# Patient Record
Sex: MALE | NOT HISPANIC OR LATINO
[De-identification: names, ages, dates, MRNs, and addresses within clinical notes are randomized per-mention and may not be internally consistent; named-entity substitution may affect disease eponyms.]

---

## 2020-02-14 ENCOUNTER — RESULT REVIEW (OUTPATIENT)
Age: 45
End: 2020-02-14

## 2022-03-12 ENCOUNTER — EMERGENCY (EMERGENCY)
Facility: HOSPITAL | Age: 47
LOS: 1 days | Discharge: HOME | End: 2022-03-12
Attending: EMERGENCY MEDICINE
Payer: COMMERCIAL

## 2022-03-12 VITALS
RESPIRATION RATE: 19 BRPM | OXYGEN SATURATION: 97 % | TEMPERATURE: 98 F | DIASTOLIC BLOOD PRESSURE: 96 MMHG | SYSTOLIC BLOOD PRESSURE: 163 MMHG | HEART RATE: 91 BPM | HEIGHT: 69 IN | WEIGHT: 175.93 LBS

## 2022-03-12 PROCEDURE — 99282 EMERGENCY DEPT VISIT SF MDM: CPT

## 2022-03-12 PROCEDURE — 99283 EMERGENCY DEPT VISIT LOW MDM: CPT

## 2022-03-12 NOTE — ED ADULT NURSE NOTE - OBJECTIVE STATEMENT
46 yr old male to ED with c/o rt eye inj this am. Denies LOC Denies change vision Small abrasion corn eyelid Not actively bleeding. Denies acute discomfort.

## 2022-03-12 NOTE — ED PROVIDER NOTE - ADDITIONAL NOTES AND INSTRUCTIONS:
Patient was seen in the emergency department today, March 12, 2022. He is clear to return to work. Patient was seen in the emergency department today, March 12, 2022. As far as this injury is concerned there is no limitation to his duties stemming from this injury.

## 2022-03-12 NOTE — ED PROVIDER NOTE - OBJECTIVE STATEMENT
46 year old male with pmh psoriatic arthritis presents with lateral eyelid abrasion. States the nozzle on his firetruck scraped the the skin lateral to his right eye. Denies vision changes, foreign body sensation, pain with blinking, dizziness, eye pain. Tetanus is up to date, last vaccination 2 years ago. Pt cleaned the wound with hydrogen peroxide.

## 2022-03-12 NOTE — ED PROVIDER NOTE - NSFOLLOWUPINSTRUCTIONS_ED_ALL_ED_FT
You were seen in the emergency department for your eye abrasion. Please apply the erythromycin ointment to the affected area twice a day.     Keep the wound clean and dry. If you see any signs or symptoms of infection (e.g. increasing redness, discharge, worsening pain, swelling) please return to the emergency department.    Continue all regular home medications as prescribed.    Follow up with your primary doctor within 3 days.    You were given copies of the labs and imaging results, please take them to your follow up appointments.    Return to the ER for any worsening symptoms or concerns. You were seen in the emergency department for your eye abrasion. Please apply the erythromycin ointment to the affected area twice a day.     Keep the wound clean and dry. If you see any signs or symptoms of infection (e.g. increasing redness, discharge, worsening pain, swelling) please return to the emergency department.    Your blood pressure was elevated in the emergency department today. Please follow up with your primary care doctor.    Continue all regular home medications as prescribed.    Follow up with your primary doctor within 3 days.    You were given copies of the labs and imaging results, please take them to your follow up appointments.    Return to the ER for any worsening symptoms or concerns.

## 2022-03-12 NOTE — ED PROVIDER NOTE - CLINICAL SUMMARY MEDICAL DECISION MAKING FREE TEXT BOX
Giselle Vazquez DO PGY-1  46 year old male with pmh psoriatic arthritis presents with lateral eyelid abrasion. States the nozzle on his firetruck scraped the the skin lateral to his right eye. Denies vision changes, foreign body sensation, pain with blinking, dizziness, eye pain. Tetanus is up to date, last vaccination 2 years ago. Pt cleaned the wound with hydrogen peroxide. 0.5 cm superficial abrasion lateral to eye without lid involvement, vision intact, no corneal abrasions. Tetanus UTD. Will apply topical erythromycin and d/c.

## 2022-03-12 NOTE — ED ADULT NURSE NOTE - CAS EDN DISCHARGE INTERVENTIONS
Pharmacy Medication History Note      List of current medications patient is taking is complete. Source of information: patient     Changes made to medication list:  Medications removed (include reason, ex. therapy complete or physician discontinued):  none    Medications added/doses adjusted:  none    Other notes (ex. Recent course of antibiotics, Coumadin dosing):  Denies use of other OTC or herbal medications.       Allergies reviewed      Electronically signed by NICOLE Gomes [facility] on 5/14/2019 at 9:51 AM n/a

## 2022-03-12 NOTE — ED PROVIDER NOTE - PATIENT PORTAL LINK FT
You can access the FollowMyHealth Patient Portal offered by [facility] by registering at the following website: [email or web address]. By joining [facility]’s FollowMyHealth portal, you will also be able to view your health information using other applications (apps) compatible with our system.

## 2022-03-12 NOTE — ED PROVIDER NOTE - CARE PLAN
Principal Discharge DX:	Superficial abrasion   1 Principal Discharge DX:	Superficial abrasion  Goal:	lateral to the R eye

## 2022-03-12 NOTE — ED PROVIDER NOTE - PHYSICAL EXAMINATION
PHYSICAL EXAM:  CONSTITUTIONAL: Well appearing, awake, alert, oriented to person, place, time/situation and in no apparent distress.  HEAD: Atraumatic  EYES: Clear bilaterally, pupils equal, round and reactive to light. No corneal abrasions. Vision intact OD 20/25, OS 20/20. No pain with extraocular movement.   ENMT: Airway patent, Nasal mucosa clear. Mouth with normal mucosa. Uvula is midline.   CARDIAC: Normal rate, regular rhythm.  +S1/S2.  No murmurs, rubs or gallops.  RESPIRATORY: Breathing unlabored.   NEUROLOGICAL: Alert and oriented, no focal deficits, no motor or sensory deficits.  SKIN: Skin warm and dry. No evidence of rashes or lesions. 0.5 cm linear superficial abrasion just lateral to eye without eyelid involvement.

## 2022-03-12 NOTE — ED PROVIDER NOTE - NS ED ROS FT
ROS:  -Constitutional: Denies fever  -Head: Denies headache  -Eyes: Denies blurry vision  -Cardiovascular: Denies chest pain  -Pulmonary: Denies shortness of breath  -Gastrointestinal: Denies nausea or diarrhea  -Genitourinary: Denies dysuria  -Skin: Denies new rashes  -Neuro: Denies numbness or tingling

## 2023-03-16 PROBLEM — Z00.00 ENCOUNTER FOR PREVENTIVE HEALTH EXAMINATION: Status: ACTIVE | Noted: 2023-03-16

## 2025-01-03 ENCOUNTER — TRANSCRIPTION ENCOUNTER (OUTPATIENT)
Age: 50
End: 2025-01-03

## 2025-01-03 ENCOUNTER — RESULT REVIEW (OUTPATIENT)
Age: 50
End: 2025-01-03

## 2025-01-03 ENCOUNTER — APPOINTMENT (OUTPATIENT)
Dept: ORTHOPEDIC SURGERY | Facility: CLINIC | Age: 50
End: 2025-01-03
Payer: COMMERCIAL

## 2025-01-03 PROCEDURE — 73030 X-RAY EXAM OF SHOULDER: CPT | Mod: RT

## 2025-01-03 PROCEDURE — 73010 X-RAY EXAM OF SHOULDER BLADE: CPT | Mod: RT

## 2025-01-03 PROCEDURE — 99203 OFFICE O/P NEW LOW 30 MIN: CPT

## 2025-01-08 ENCOUNTER — APPOINTMENT (OUTPATIENT)
Dept: ORTHOPEDIC SURGERY | Facility: CLINIC | Age: 50
End: 2025-01-08
Payer: COMMERCIAL

## 2025-01-08 DIAGNOSIS — M75.21 BICIPITAL TENDINITIS, RIGHT SHOULDER: ICD-10-CM

## 2025-01-08 DIAGNOSIS — M75.41 IMPINGEMENT SYNDROME OF RIGHT SHOULDER: ICD-10-CM

## 2025-01-08 DIAGNOSIS — M75.51 BURSITIS OF RIGHT SHOULDER: ICD-10-CM

## 2025-01-08 DIAGNOSIS — M75.111 INCOMPLETE ROTATOR CUFF TEAR OR RUPTURE OF RIGHT SHOULDER, NOT SPECIFIED AS TRAUMATIC: ICD-10-CM

## 2025-01-08 DIAGNOSIS — M25.511 PAIN IN RIGHT SHOULDER: ICD-10-CM

## 2025-01-08 PROCEDURE — 20611 DRAIN/INJ JOINT/BURSA W/US: CPT | Mod: RT

## 2025-01-08 PROCEDURE — 99213 OFFICE O/P EST LOW 20 MIN: CPT | Mod: 25

## 2025-03-10 NOTE — ED PROVIDER NOTE - ATTENDING CONTRIBUTION TO CARE
Attending MD Suggs: I personally have seen and examined this patient.  Resident note reviewed and agree on plan of care and except where noted.  See below for details.     seen in [place]    46M with PMH/PSH including psoriatic arthritis presents to the ED with lateral eyelid abrasion.  Reports he is a [employer]  and was going up on truck and scraped the skin just lateral to his R eye with a nozzle sticking out from truck.  Reports has had a corneal abrasion in the past and this does not feel like that.  Denies change in vision, double vision, sudden loss of vision, blurry vision.  Denies foreign body sensation.  Denies pain with blinking.  Denies pain with EOM.  Reports tetanus updated two years ago by PMD.  Reports that immediately after incident cleaned area of wound with Hydrogen Peroxide.  Denies other injury.      Exam:   General: NAD  HENT: head NCAT, airway patent   Eyes: PERRL, EOMI, confrontational VF full, Va sc OD 20/20, OS 20/25, no periorbital ecchymosis, no tenderness to palpation of orbital rim, lid margins clear, no retained foreign body on lid eversion, no conjunctival injection, no corneal defect, AC no cells or flare, T OD 15, OS 15, 0.5cm superficial abrasion just lateral to the lateral canthus of the R eye  Chest: symmetric chest rise, no increased work of breathing  MSK: ranging neck freely  Neuro: moving all extremities spontaneously, sensory grossly intact, no gross neuro deficits  Psych: normal mood and affect    A/P: 46M with superficial abrasion to the skin lateral to the lateral canthus of the R eye NOT involving the canthus, applied erythromycin bright to the area.  STable for discharge. Follow up instructions given, importance of follow up emphasized, return to ED parameters reviewed and patient verbalized understanding.  All questions answered, all concerns addressed. no pain, swelling or deformity of joints